# Patient Record
Sex: MALE | Race: WHITE | URBAN - METROPOLITAN AREA
[De-identification: names, ages, dates, MRNs, and addresses within clinical notes are randomized per-mention and may not be internally consistent; named-entity substitution may affect disease eponyms.]

---

## 2017-06-20 ENCOUNTER — OFFICE VISIT (OUTPATIENT)
Dept: PHARMACY | Facility: CLINIC | Age: 22
End: 2017-06-20

## 2017-06-20 VITALS
HEART RATE: 85 BPM | BODY MASS INDEX: 21.86 KG/M2 | TEMPERATURE: 98.3 F | WEIGHT: 136 LBS | HEIGHT: 66 IN | SYSTOLIC BLOOD PRESSURE: 111 MMHG | DIASTOLIC BLOOD PRESSURE: 71 MMHG | RESPIRATION RATE: 22 BRPM | OXYGEN SATURATION: 98 %

## 2017-06-20 DIAGNOSIS — F41.1 GENERALIZED ANXIETY DISORDER: Primary | ICD-10-CM

## 2017-06-20 RX ORDER — QUETIAPINE FUMARATE 50 MG/1
50 TABLET, FILM COATED ORAL
COMMUNITY
Start: 2017-05-26

## 2017-06-20 RX ORDER — ESCITALOPRAM OXALATE 20 MG/1
20 TABLET ORAL DAILY
COMMUNITY
End: 2017-07-11

## 2017-06-20 ASSESSMENT — ANXIETY QUESTIONNAIRES
7. FEELING AFRAID AS IF SOMETHING AWFUL MIGHT HAPPEN: NOT AT ALL
IF YOU CHECKED OFF ANY PROBLEMS ON THIS QUESTIONNAIRE, HOW DIFFICULT HAVE THESE PROBLEMS MADE IT FOR YOU TO DO YOUR WORK, TAKE CARE OF THINGS AT HOME, OR GET ALONG WITH OTHER PEOPLE: SOMEWHAT DIFFICULT
GAD7 TOTAL SCORE: 3
5. BEING SO RESTLESS THAT IT IS HARD TO SIT STILL: NOT AT ALL
2. NOT BEING ABLE TO STOP OR CONTROL WORRYING: NOT AT ALL
1. FEELING NERVOUS, ANXIOUS, OR ON EDGE: SEVERAL DAYS
3. WORRYING TOO MUCH ABOUT DIFFERENT THINGS: SEVERAL DAYS
6. BECOMING EASILY ANNOYED OR IRRITABLE: SEVERAL DAYS

## 2017-06-20 ASSESSMENT — PATIENT HEALTH QUESTIONNAIRE - PHQ9: 5. POOR APPETITE OR OVEREATING: NOT AT ALL

## 2017-06-20 NOTE — PROGRESS NOTES
SUBJECTIVE: Francisco J is a 21 year old male who was referred by his chemical dependency treatment center, [facility], for MT services for a complete medication review and medication counseling around transitions of care.  Francisco J has been at [facility] for almost 4 months.  He is happy with his progress in the program.  He recently transitioned to [facility]'s sober living home - the [facility].  While living there, he still attends day programming M-F and Saturday mornings.  He is set to graduate from [facility] in September, but hopes to continue living in the [facility] through December while he starts to work.    Francisco J notes that he is taking esctialopram for anxiety.  He feels that his anxiety could be better controlled, but wonders if his slight heightened anxiety may be associated with his recent transition.  He recently learned that 20 mg is the maximum dose of escitalopram.    Francisco J is taking quetiapine to help with sleep.  He notes that he previously took trazodone, but became groggy and had some muscle soreness which he associates with the medication. He is much happier taking quetiapine.  He initially experienced some dry mouth with the medication, but feels that has gone away.  When asked, he notes his weight has not changed since starting quetiapine.    Francisco J is interested in stopping smoking.  He notes that the nicotine inhaler was not covered by insurance.  He has had troubles quitting cold turkey and would like NRT to help him quit.    Francisco J wonders if there are non controlled medications that can be used to help his focus.  When asked, he notes that he doesn't have a diagnosis of ADD, but did start the evaluation (never follow through) in [facility].    Francisco J plans to continue to use his current [facility] pharmacy.  He has not established care with a primary provider.    Environmental factors that impact patient: Recently moved into sober [facility], more independence now.    Patient reports being compliant.  Sets  "medication bottles next to wallet and no problem with taking.    Patient Active Problem List   Diagnosis     Chemical dependency (H)     Rash and nonspecific skin eruption     Eczema, unspecified type     THA (generalized anxiety disorder)        OBJECTIVE:     THA-7 SCORE 9/6/2016 12/7/2016 6/20/2017   Total Score 6 14 3       PHQ-9 SCORE 9/6/2016 12/7/2016 6/20/2017   Total Score 7 10 3         VITALS:  BP Readings from Last 3 Encounters:   06/20/17 111/71   09/15/16 121/70   09/08/16 107/85           Weight:   Wt Readings from Last 1 Encounters:   06/20/17 136 lb (61.7 kg)       Height:   Ht Readings from Last 1 Encounters:   06/20/17 5' 6\" (167.6 cm)       LABORATORY VALUES:   Last A1C:  No results found for: A1C.    Last Basic Metabolic Panel:  No results found for: NA   No results found for: POTASSIUM  No results found for: CHLORIDE  No results found for: MICHAEL  No results found for: CO2  No results found for: BUN  No results found for: CR  No results found for: GLC    Lipid Panel Labs  No results found for: CHOL  No results found for: TRIG  No results found for: HDL  No results found for: LDL    Hepatic Panel Labs  No results found for: AST  No results found for: ALT      SOCIAL AND FAMILY HISTORY  Social History   Substance Use Topics     Smoking status: Current Every Day Smoker     Packs/day: 0.25     Types: Cigarettes     Smokeless tobacco: Never Used     Alcohol use 0.0 oz/week     0 Standard drinks or equivalent per week      Comment: Prior treatment    .  Most Recent Immunizations   Administered Date(s) Administered     TDAP Vaccine (Adacel) 09/04/2013       CURRENT MEDICATIONS:   Current Outpatient Prescriptions   Medication Sig Dispense Refill     QUEtiapine (SEROQUEL) 50 MG tablet Take 50 mg by mouth       escitalopram (LEXAPRO) 20 MG tablet Take 20 mg by mouth daily       albuterol (PROAIR HFA, PROVENTIL HFA, VENTOLIN HFA) 108 (90 BASE) MCG/ACT inhaler Inhale 2 puffs into the lungs every 6 hours as " needed for shortness of breath / dyspnea or wheezing       Acetaminophen (TYLENOL PO) Take 650 mg by mouth every 4 hours as needed for mild pain or fever         ALLERGIES:     Allergies   Allergen Reactions     Cats Hives     Nkda [No Known Drug Allergies]      Seasonal Allergies           ASSESSMENT:  Anxiety: At goal per GAD7.  Borderline goal per patient report.  Patient feels anxiety could be improved, but thinks it may be slightly increased since he has recently moved out of [facility].  He would like to wait to make any adjustments to medications until his life is a bit more stable.  This sounds like a good plan.  Patient notes he is using quetiapine for sleep, but this medication can also be used for anxiety and it is possible he will continue to experience some benefits from this medication.    Insomnia: At goal per patient.  Quetiapine not generally recommended for insomnia, but per above, it is possible the quetiapine is helping with anxiety and so this medication becomes a better option for this patient.  Patient does not believe he has had cholesterol panel or BG monitored.  It would be best to obtain baseline labs and then labs 3 months after initiation.  DTP:  Additional monitoring needed    Focus: Not at goal per patient.  Since patient doesn't have a diagnosis of ADD, will be best to first have evaluation.  If diagnosis is made, atomoxetine could be a good option for this patient.    Smoking cessation: Not at goal.  Patient would benefit from NRT that he thinks he would use.  He is most interested in patches.  Also somewhat interested in lozenges.     Medication transition: Patient in need of PCP.  [facility]ed [facility] at [facility].  Recommended patient see Candie Jj in clinic due to her familiarity with [facility].    All medications were reviewed and found to be indicated, effective, safe and convenient unless drug therapy problem identified as described above.    PLAN:   - Lipid panel  and BG at f/u visit  - Establish care, obtain refills at f/u visit  - Discuss focus questions, potential ADD at f/u  - At f/u discuss NRT - recommend patches with addition of lozenges    Options for treatment and/or follow-up care were reviewed with the patient. Francisco J Hendrix was engaged and actively involved in the decision making process. He/She verbalized understanding of the options discussed and was satisfied with the final plan.    Patient was provided with written instructions/medication list via AVS.     Medical conditions reviewed: 4    Medications reviewed:4    DTP identified: 1    Time spent: 45 minutes    Level of service: level 2, no charge

## 2017-06-20 NOTE — MR AVS SNAPSHOT
After Visit Summary   6/20/2017    Francisco J Hendrix    MRN: [id number]           Patient Information     Date Of Birth          1995        Visit Information        Provider Department      6/20/2017 11:00 AM Keyona Mc, PharmD [facility]        Care Instructions    Pharmacy Information    Your prescriptions are currently at [facility] at [address] [facility] ([phone])      The pharmacy you will use after you have been discharged is the same [facility] pharmacy       To transfer medications, call the new pharmacy and ask them to call the [facility] and provide them with [facility]  phone number.  Ask for all of your medications to be transferred.    Primary Care Information    Your primary care provider will be Candie Jj and their phone number is [phone]    Consider buying a multivitamin to take daily.    We'll follow up at your next visit to talk about focus/ potential ADD and treatment.                   Follow-ups after your visit        Who to contact     Please call your clinic at [phone] to:    Ask questions about your health    Make or cancel appointments    Discuss your medicines    Learn about your test results    Speak to your doctor   If you have compliments or concerns about an experience at your clinic, or if you wish to file a complaint, please contact [facility] Patient Relations at [phone] or email us at [email or web address]         Additional Information About Your Visit        MyChart Information     BookBub is an electronic gateway that provides easy, online access to your medical records. With BookBub, you can request a clinic appointment, read your test results, renew a prescription or communicate with your care team.     To sign up for [employer] visit the website at [email or web address]   You will be asked to enter the access code listed below, as well as some personal information. Please follow the  "directions to create your username and password.     Your access code is: [email or web address]  Expires: 2017 11:48 AM     Your access code will  in 90 days. If you need help or a new code, please contact your [facility] or call [phone] for assistance.        Care EveryWhere ID     This is your Care EveryWhere ID. This could be used by other organizations to access your [facility] medical records  [id number]        Your Vitals Were     Pulse Temperature Respirations Height Pulse Oximetry BMI (Body Mass Index)    85 98.3  F (36.8  C) (Oral) 22 5' 6\" (167.6 cm) 98% 21.95 kg/m2       Blood Pressure from Last 3 Encounters:   17 111/71   09/15/16 121/70   16 107/85    Weight from Last 3 Encounters:   17 136 lb (61.7 kg)   09/15/16 136 lb (61.7 kg)   16 135 lb (61.2 kg)              Today, you had the following     No orders found for display         Today's Medication Changes          These changes are accurate as of: 17 11:48 AM.  If you have any questions, ask your nurse or doctor.               These medicines have changed or have updated prescriptions.        Dose/Directions    LEXAPRO 20 MG tablet   This may have changed:  Another medication with the same name was removed. Continue taking this medication, and follow the directions you see here.   Generic drug:  escitalopram   Changed by:  Keyona Mc PharmD        Dose:  20 mg   Take 20 mg by mouth daily   Refills:  0         Stop taking these medicines if you haven't already. Please contact your care team if you have questions.     permethrin 5 % cream   Commonly known as:  ELIMITE   Stopped by:  Keyona Mc PharmD                    Primary Care Provider Office Phone # Fax #    Keyona Mc PharmD [phone]       [facility] [address] [facility] [address]        Thank you!     Thank you for choosing [facility]  for your care. Our goal " is always to provide you with excellent care. Hearing back from our patients is one way we can continue to improve our services. Please take a few minutes to complete the written survey that you may receive in the mail after your visit with us. Thank you!             Your Updated Medication List - Protect others around you: Learn how to safely use, store and throw away your medicines at www.disposemymeds.org.          This list is accurate as of: 6/20/17 11:48 AM.  Always use your most recent med list.                   Brand Name Dispense Instructions for use    albuterol 108 (90 BASE) MCG/ACT Inhaler    PROAIR HFA/PROVENTIL HFA/VENTOLIN HFA     Inhale 2 puffs into the lungs every 6 hours as needed for shortness of breath / dyspnea or wheezing       LEXAPRO 20 MG tablet   Generic drug:  escitalopram      Take 20 mg by mouth daily       QUEtiapine 50 MG tablet    SEROquel     Take 50 mg by mouth       TYLENOL PO      Take 650 mg by mouth every 4 hours as needed for mild pain or fever

## 2017-06-20 NOTE — NURSING NOTE
"21 year old  Chief Complaint   Patient presents with     Consult     Medication Review - No MAR       Blood pressure 111/71, pulse 85, temperature 98.3  F (36.8  C), temperature source Oral, resp. rate 22, height 5' 6\" (167.6 cm), weight 136 lb (61.7 kg), SpO2 98 %. Body mass index is 21.95 kg/(m^2).  BP completed using cuff size: regular    Patient Active Problem List   Diagnosis     Chemical dependency (H)     Rash and nonspecific skin eruption     Eczema, unspecified type     THA (generalized anxiety disorder)       Wt Readings from Last 2 Encounters:   06/20/17 136 lb (61.7 kg)   09/15/16 136 lb (61.7 kg)     BP Readings from Last 3 Encounters:   06/20/17 111/71   09/15/16 121/70   09/08/16 107/85       Current Outpatient Prescriptions   Medication     QUEtiapine (SEROQUEL) 50 MG tablet     escitalopram (LEXAPRO) 10 MG tablet     albuterol (PROAIR HFA, PROVENTIL HFA, VENTOLIN HFA) 108 (90 BASE) MCG/ACT inhaler     Acetaminophen (TYLENOL PO)     No current facility-administered medications for this visit.      Facility-Administered Medications Ordered in Other Visits   Medication     Self Administer Medications: Behavioral Services       Social History   Substance Use Topics     Smoking status: Current Every Day Smoker     Packs/day: 0.25     Types: Cigarettes     Smokeless tobacco: Never Used     Alcohol use 0.0 oz/week     0 Standard drinks or equivalent per week      Comment: Prior treatment       Health Maintenance Due   Topic Date Due     HPV IMMUNIZATION (1 of 3 - Male 3 Dose Series) 06/23/2006     PEDS DTAP/TDAP (2 - Td) 10/02/2013       No results found for: PAP    PHQ-2 ( 1999 Pf[employer]) 6/20/2017 9/12/2013   Q1: Little interest or pleasure in doing things 1 0   Q2: Feeling down, depressed or hopeless 0 0   PHQ-2 Score 1 0       PHQ-9 SCORE 6/20/2017   Total Score 3   Some encounter information is confidential and restricted. Go to Review Flowsheets activity to see all data.       THA-7 SCORE 6/20/2017 "   Total Score 3   Some encounter information is confidential and restricted. Go to Review Flowsheets activity to see all data.       No flowsheet data found.    No Garcia CMA  June 20, 2017 11:07 AM

## 2017-06-20 NOTE — PATIENT INSTRUCTIONS
Pharmacy Information    Your prescriptions are currently at [facility] at [address] ([phone])      The pharmacy you will use after you have been discharged is the same [facility] pharmacy       To transfer medications, call the new pharmacy and ask them to call the [facility] and provide them with [facility]  phone number.  Ask for all of your medications to be transferred.    Primary Care Information    Your primary care provider will be Candie Jj and their phone number is [phone]    Consider buying a multivitamin to take daily.    We'll follow up at your next visit to talk about focus/ potential ADD and treatment.

## 2017-06-21 ASSESSMENT — ANXIETY QUESTIONNAIRES: GAD7 TOTAL SCORE: 3

## 2017-06-21 ASSESSMENT — PATIENT HEALTH QUESTIONNAIRE - PHQ9: SUM OF ALL RESPONSES TO PHQ QUESTIONS 1-9: 3

## 2017-07-11 ENCOUNTER — OFFICE VISIT (OUTPATIENT)
Dept: FAMILY MEDICINE | Facility: CLINIC | Age: 22
End: 2017-07-11

## 2017-07-11 ENCOUNTER — OFFICE VISIT (OUTPATIENT)
Dept: PHARMACY | Facility: CLINIC | Age: 22
End: 2017-07-11

## 2017-07-11 VITALS
HEART RATE: 72 BPM | BODY MASS INDEX: 22.03 KG/M2 | HEIGHT: 66 IN | DIASTOLIC BLOOD PRESSURE: 68 MMHG | WEIGHT: 137.1 LBS | TEMPERATURE: 98.2 F | SYSTOLIC BLOOD PRESSURE: 116 MMHG | OXYGEN SATURATION: 99 %

## 2017-07-11 DIAGNOSIS — G47.00 PERSISTENT INSOMNIA: ICD-10-CM

## 2017-07-11 DIAGNOSIS — F19.20 CHEMICAL DEPENDENCY (H): Primary | ICD-10-CM

## 2017-07-11 DIAGNOSIS — R06.02 SOB (SHORTNESS OF BREATH): ICD-10-CM

## 2017-07-11 DIAGNOSIS — Z13.220 LIPID SCREENING: ICD-10-CM

## 2017-07-11 DIAGNOSIS — Z72.0 TOBACCO ABUSE: ICD-10-CM

## 2017-07-11 DIAGNOSIS — R41.840 DIFFICULTY CONCENTRATING: ICD-10-CM

## 2017-07-11 DIAGNOSIS — F41.1 GAD (GENERALIZED ANXIETY DISORDER): ICD-10-CM

## 2017-07-11 RX ORDER — ESCITALOPRAM OXALATE 20 MG/1
20 TABLET ORAL DAILY
Qty: 30 TABLET | Refills: 2 | Status: SHIPPED | OUTPATIENT
Start: 2017-07-11

## 2017-07-11 RX ORDER — ASPIRIN 325 MG
650 TABLET ORAL PRN
COMMUNITY
End: 2017-07-11

## 2017-07-11 RX ORDER — ESCITALOPRAM OXALATE 20 MG/1
20 TABLET ORAL DAILY
COMMUNITY
Start: 2017-01-20 | End: 2017-07-11

## 2017-07-11 RX ORDER — NICOTINE 21 MG/24HR
1 PATCH, TRANSDERMAL 24 HOURS TRANSDERMAL EVERY 24 HOURS
Qty: 30 PATCH | Refills: 1 | Status: SHIPPED | OUTPATIENT
Start: 2017-07-11

## 2017-07-11 RX ORDER — ALBUTEROL SULFATE 90 UG/1
2 AEROSOL, METERED RESPIRATORY (INHALATION) PRN
COMMUNITY
Start: 2016-08-24

## 2017-07-11 ASSESSMENT — ANXIETY QUESTIONNAIRES
1. FEELING NERVOUS, ANXIOUS, OR ON EDGE: MORE THAN HALF THE DAYS
2. NOT BEING ABLE TO STOP OR CONTROL WORRYING: SEVERAL DAYS
3. WORRYING TOO MUCH ABOUT DIFFERENT THINGS: NOT AT ALL
GAD7 TOTAL SCORE: 4
6. BECOMING EASILY ANNOYED OR IRRITABLE: SEVERAL DAYS
IF YOU CHECKED OFF ANY PROBLEMS ON THIS QUESTIONNAIRE, HOW DIFFICULT HAVE THESE PROBLEMS MADE IT FOR YOU TO DO YOUR WORK, TAKE CARE OF THINGS AT HOME, OR GET ALONG WITH OTHER PEOPLE: SOMEWHAT DIFFICULT
5. BEING SO RESTLESS THAT IT IS HARD TO SIT STILL: NOT AT ALL
7. FEELING AFRAID AS IF SOMETHING AWFUL MIGHT HAPPEN: NOT AT ALL

## 2017-07-11 ASSESSMENT — PATIENT HEALTH QUESTIONNAIRE - PHQ9: 5. POOR APPETITE OR OVEREATING: NOT AT ALL

## 2017-07-11 ASSESSMENT — PAIN SCALES - GENERAL: PAINLEVEL: NO PAIN (0)

## 2017-07-11 NOTE — PROGRESS NOTES
SUBJECTIVE: Francisco J is a 22 year old male who is here to establish primary care,and  follow up on his medications. Co-visit with Cnadie Jj NP.      Environmental factors that impact patient: housing situation - currently has 10 roommates including 2 who share the same floor in the loft of his current house. Also impacted by programing at  [employer] as he reports that he mainly smokes when attending program there. Francisco J also reports that he is hoping to be working within the next week or 2 at [employer].    Francisco J reports being compliant all of the time and patient reports some side effects including dry mouth from the Seroquel.     Anxiety: Francicso J is interested in tapering off of the escitalopram. States that there was one time last week where he forgot to take it in the morning and didn't notice any difference in his mood.  States that he has also been feeling more on edge lately despite being on the escitalopram.    Insomnia: Francisco J states that with the Seroquel he wakes up about once per night where as without the Seroquel he wakes up 3-4 times and also takes longer to fall asleep.  However, he states that he might be interested in tapering off of the Seroquel in a month or so once he is busier with school and work as he sleeps better when he is busy during the day.    Smoking Cessation: Francisco J states that he wants to try the patches for smoking cessation. Is hoping to quit by the end of the month.    Back Pain: States back pain has gotten betters since he started stretching in the morning and evening. Uses Tylenol a lot less now.    Patient Active Problem List   Diagnosis     Chemical dependency (H)     Eczema, unspecified type     THA (generalized anxiety disorder)     Persistent insomnia     SOB (shortness of breath)     Tobacco abuse        OBJECTIVE:     THA-7 SCORE 6/20/2017 7/11/2017   Total Score 3 4   Some encounter information is confidential and restricted. Go to Review Flowsheets activity to see all  "data.       PHQ-9 SCORE 6/20/2017 7/11/2017   Total Score 3 3   Some encounter information is confidential and restricted. Go to Review Flowsheets activity to see all data.     VITALS:  BP Readings from Last 3 Encounters:   07/11/17 116/68   06/20/17 111/71   09/15/16 121/70         Weight:   Wt Readings from Last 1 Encounters:   07/11/17 137 lb 1.6 oz (62.2 kg)       Height:   Ht Readings from Last 1 Encounters:   07/11/17 5' 6.1\" (167.9 cm)     SOCIAL AND FAMILY HISTORY  Social History   Substance Use Topics     Smoking status: Current Every Day Smoker     Packs/day: 0.25     Types: Cigarettes, Other     Smokeless tobacco: Never Used      Comment: Started smoking at age 13     Alcohol use No      Comment: Clean date 4/25/17    .  Most Recent Immunizations   Administered Date(s) Administered     TDAP Vaccine (Adacel) 09/04/2013       CURRENT MEDICATIONS:   Current Outpatient Prescriptions   Medication Sig Dispense Refill     albuterol (PROAIR HFA/PROVENTIL HFA/VENTOLIN HFA) 108 (90 BASE) MCG/ACT Inhaler Inhale 2 puffs into the lungs as needed       nicotine (NICODERM CQ) 14 MG/24HR 24 hr patch Place 1 patch onto the skin every 24 hours 30 patch 1     escitalopram (LEXAPRO) 20 MG tablet Take 1 tablet (20 mg) by mouth daily 30 tablet 2     QUEtiapine (SEROQUEL) 50 MG tablet Take 50 mg by mouth       Acetaminophen (TYLENOL PO) Take 650 mg by mouth every 4 hours as needed for mild pain or fever       [DISCONTINUED] escitalopram (LEXAPRO) 20 MG tablet Take 20 mg by mouth daily       [DISCONTINUED] escitalopram (LEXAPRO) 20 MG tablet Take 20 mg by mouth daily       [DISCONTINUED] albuterol (PROAIR HFA, PROVENTIL HFA, VENTOLIN HFA) 108 (90 BASE) MCG/ACT inhaler Inhale 2 puffs into the lungs every 6 hours as needed for shortness of breath / dyspnea or wheezing         ALLERGIES:     Allergies   Allergen Reactions     Cats Hives     Nkda [No Known Drug Allergies]      Seasonal Allergies           ASSESSMENT:    Anxiety: Not " optimally controlled with current medication.  May benefit from tapering off of escitalopram or switching to another medication in the future, but with upcoming changes now may not be the optimal time to do so.     Insomnia: At goal per patient.  Plan to taper off of Seroquel in a few months after living situation and schedule have stabilized sounds like a good plan.  Would be good to obtain a baseline BG and lipid panel and then labs 3 months after.    Smoking Cessation: Appropriate starting dose of the Nicoderm patch would be14mg/24hr for the first 6 weeks as only smoking 0.25-0.5packs/day. Would need to establish a quite date to start the patches on.    Back Pain: Controlled per patient report.    All medications were reviewed and found to be indicated, effective, safe and convenient unless drug therapy problem identified as described above.    PLAN:     Anxiety: Continue taking Escitalopram 20mg tablet daily.    Insomnia: Continue taking Seroqeul 50mg tablet in the evening.    Smoking Cessation: Start 14mg/24hr Nicoderm patch on 7/22. May use E-cig as needed for breakthrough cravings.    Obtain fasting BMP and Lipid panel in one week.    Options for treatment and/or follow-up care were reviewed with the patient. Francisco J Hendrix was engaged and actively involved in the decision making process. He/She verbalized understanding of the options discussed and was satisfied with the final plan.    Patient was provided with written instructions/medication list via AVS.     Linda Alvarez, Pharm D Student    Medical conditions reviewed: 4    Medications reviewed: 4    DTP identified: 1    Time spent: 45 min    Level of service: 2

## 2017-07-11 NOTE — MR AVS SNAPSHOT
After Visit Summary   7/11/2017    Francisco J Hendrix    MRN: [id number]           Patient Information     Date Of Birth          1995        Visit Information        Provider Department      7/11/2017 2:30 PM Candie Jj NP [facility]        Today's Diagnoses     Chemical dependency (H)    -  1    SOB (shortness of breath)        Tobacco abuse        THA (generalized anxiety disorder)        Persistent insomnia        Lipid screening        Difficulty concentrating           Follow-ups after your visit        Additional Services     PSYCHIATRY REFERRAL [facility]       Your provider has referred you to: [facility] for a Primary Care Consultation. Please call [phone] to make an appointment.    Clinic is located at:  [facility], 2nd Floor  [address], [address]  [facility] [address]    Please complete the following questions:    Reason for Referral: attentional problems    What specific question is it most critical for you and the patient to have answered? Does Francisco J have ADD or ADHD    You have requested a one-time CONSULTATION visit. This means that although we will make recommendations and provide a multi-step plan where appropriate, the Psychiatry Clinic will not provide ongoing care. We expect that as the referring provider, you will continue to see and manage the patient's care primarily, and we will remain available via Epic for any ongoing questions that arise after the initial visit. In rare and unusually complex cases, we may schedule a follow up visit to complete the assessment, but this should also not be seen as taking over the case.    Is this acceptable to you? Yes                  Follow-up notes from your care team     Return in about 1 month (around 8/11/2017) for Follow up smoking, medication.      Future tests that were ordered for you today     Open Future Orders        Priority Expected Expires  "Ordered    Comprehensive metabolic panel Routine  2018    Lipid panel reflex to direct LDL Routine  2018    Spirometry, Breathing Capacity Routine  2017            Who to contact     Please call your clinic at [phone] to:    Ask questions about your health    Make or cancel appointments    Discuss your medicines    Learn about your test results    Speak to your doctor   If you have compliments or concerns about an experience at your clinic, or if you wish to file a complaint, please contact [facility] Patient Relations at [phone] or email us at [email or web address]         Additional Information About Your Visit        [employer] Information     [employer] is an electronic gateway that provides easy, online access to your medical records. With [employer], you can request a clinic appointment, read your test results, renew a prescription or communicate with your care team.     To sign up for Story[employer] visit the website at [email or web address]   You will be asked to enter the access code listed below, as well as some personal information. Please follow the directions to create your username and password.     Your access code is: [email or web address]  Expires: 2017 11:48 AM     Your access code will  in 90 days. If you need help or a new code, please contact your [facility] or call [phone] for assistance.        Care EveryWhere ID     This is your Care EveryWhere ID. This could be used by other organizations to access your [facility] medical records  [id number]        Your Vitals Were     Pulse Temperature Height Pulse Oximetry BMI (Body Mass Index)       72 98.2  F (36.8  C) (Oral) 5' 6.1\" (167.9 cm) 99% 22.06 kg/m2        Blood Pressure from Last 3 Encounters:   17 116/68   17 111/71   09/15/16 121/70    Weight from Last 3 Encounters:   17 137 lb 1.6 oz (62.2 kg)   17 " 136 lb (61.7 kg)   09/15/16 136 lb (61.7 kg)              We Performed the Following     PSYCHIATRY REFERRAL [facility]     SMOKING CESSATION COUNSELING 3-10 MIN          Today's Medication Changes          These changes are accurate as of: 7/11/17  3:45 PM.  If you have any questions, ask your nurse or doctor.               Start taking these medicines.        Dose/Directions    nicotine 14 MG/24HR 24 hr patch   Commonly known as:  NICODERM CQ   Used for:  Tobacco abuse   Started by:  Candie Jj NP        Dose:  1 patch   Place 1 patch onto the skin every 24 hours   Quantity:  30 patch   Refills:  1         Stop taking these medicines if you haven't already. Please contact your care team if you have questions.     aspirin 325 MG tablet   Stopped by:  Candie Jj NP                Where to get your medicines      These medications were sent to [facility]/pharmacy #[id number] [place], [place] - [address]  [address] [facility] [address]     Phone:  [phone]     nicotine 14 MG/24HR 24 hr patch         Some of these will need a paper prescription and others can be bought over the counter.  Ask your nurse if you have questions.     Bring a paper prescription for each of these medications     escitalopram 20 MG tablet                Primary Care Provider Office Phone # Fax #    Keyona Mc, PharmD [phone]       [facility] [address] [place] [address]        Equal Access to Services     АЛЕКСАНДР CRUZ AH: Hadii christina calix hadasho Soomaali, waaxda luqadaha, qaybta kaalmada adeegyada, harley pennington. So [facility] [phone].    ATENCIÓN: Si habla español, tiene a romero disposición servicios gratuitos de asistencia lingüística. Llame al [phone].    We comply with applicable federal civil rights laws and [place] laws. We do not discriminate on the basis of race, color, national origin, age, disability sex, sexual orientation or gender  identity.            Thank you!     Thank you for choosing [facility]  for your care. Our goal is always to provide you with excellent care. Hearing back from our patients is one way we can continue to improve our services. Please take a few minutes to complete the written survey that you may receive in the mail after your visit with us. Thank you!             Your Updated Medication List - Protect others around you: Learn how to safely use, store and throw away your medicines at www.disposemymeds.org.          This list is accurate as of: 7/11/17  3:45 PM.  Always use your most recent med list.                   Brand Name Dispense Instructions for use Diagnosis    albuterol 108 (90 BASE) MCG/ACT Inhaler    PROAIR HFA/PROVENTIL HFA/VENTOLIN HFA     Inhale 2 puffs into the lungs as needed        escitalopram 20 MG tablet    LEXAPRO    30 tablet    Take 1 tablet (20 mg) by mouth daily    THA (generalized anxiety disorder)       nicotine 14 MG/24HR 24 hr patch    NICODERM CQ    30 patch    Place 1 patch onto the skin every 24 hours    Tobacco abuse       QUEtiapine 50 MG tablet    SEROquel     Take 50 mg by mouth        TYLENOL PO      Take 650 mg by mouth every 4 hours as needed for mild pain or fever

## 2017-07-11 NOTE — MR AVS SNAPSHOT
After Visit Summary   7/11/2017    Francisco J Hendrix    MRN: [id number]           Patient Information     Date Of Birth          1995        Visit Information        Provider Department      7/11/2017 2:30 PM Keyona Mc PharmD [facility]        Today's Diagnoses     Chemical dependency (H)    -  1    THA (generalized anxiety disorder)        Tobacco abuse           Follow-ups after your visit        Your next 10 appointments already scheduled     Aug 15, 2017  9:00 AM CDT   Return Visit with Candie Jj NP   [facility] ([facility])    [address] [place] [address]   [phone]            Aug 15, 2017  9:00 AM CDT   Return Visit with Keyona Mc PharmD   [facility] ([facility])    [address] [place] [address]   [phone]              Who to contact     Please call your clinic at [phone] to:    Ask questions about your health    Make or cancel appointments    Discuss your medicines    Learn about your test results    Speak to your doctor   If you have compliments or concerns about an experience at your clinic, or if you wish to file a complaint, please contact [facility] Patient Relations at [phone] or email us at [email or web address]         Additional Information About Your Visit        MyChart Information     [employer] is an electronic gateway that provides easy, online access to your medical records. With [employer], you can request a clinic appointment, read your test results, renew a prescription or communicate with your care team.     To sign up for [employer] visit the website at [email or web address]   You will be asked to enter the access code listed below, as well as some personal information. Please follow the directions to create your username and password.     Your access code is: [email or web address]  Expires: 9/18/2017 11:48 AM     Your  access code will  in 90 days. If you need help or a new code, please contact your [facility] or call [phone] for assistance.        Care EveryWhere ID     This is your Care EveryWhere ID. This could be used by other organizations to access your [facility] medical records  [id number]         Blood Pressure from Last 3 Encounters:   17 116/68   17 111/71   09/15/16 121/70    Weight from Last 3 Encounters:   17 136 lb (61.7 kg)   17 137 lb 1.6 oz (62.2 kg)   17 136 lb (61.7 kg)              Today, you had the following     No orders found for display         Today's Medication Changes          These changes are accurate as of: 17 11:59 PM.  If you have any questions, ask your nurse or doctor.               Start taking these medicines.        Dose/Directions    nicotine 14 MG/24HR 24 hr patch   Commonly known as:  NICODERM CQ   Used for:  Tobacco abuse   Started by:  Candie Jj NP        Dose:  1 patch   Place 1 patch onto the skin every 24 hours   Quantity:  30 patch   Refills:  1         Stop taking these medicines if you haven't already. Please contact your care team if you have questions.     aspirin 325 MG tablet   Stopped by:  Candie Jj NP                Where to get your medicines      These medications were sent to [facility]/pharmacy #[id number] [address] [address]     Phone:  [phone]     nicotine 14 MG/24HR 24 hr patch         Some of these will need a paper prescription and others can be bought over the counter.  Ask your nurse if you have questions.     Bring a paper prescription for each of these medications     escitalopram 20 MG tablet                Primary Care Provider Office Phone # Fax #    Keyona Mc, ShanD [phone]       [facility] [address] [facility] [address]        Equal Access to Services     АЛЕКСАНДР CRUZ  AH: Hadavtar andrewszuleimazeke Nel, waaxda luqadaha, qaybta kabrynn simon, harley blossom demarcofelisa galvinkeilaradha pennington. So [facility] [phone].    ATENCIÓN: Si habla avni, tiene a romero disposición servicios gratuitos de asistencia lingüística. Llame al [phone].    We comply with applicable federal civil rights laws and [place] laws. We do not discriminate on the basis of race, color, national origin, age, disability sex, sexual orientation or gender identity.            Thank you!     Thank you for choosing [facility]  for your care. Our goal is always to provide you with excellent care. Hearing back from our patients is one way we can continue to improve our services. Please take a few minutes to complete the written survey that you may receive in the mail after your visit with us. Thank you!             Your Updated Medication List - Protect others around you: Learn how to safely use, store and throw away your medicines at www.disposemymeds.org.          This list is accurate as of: 7/11/17 11:59 PM.  Always use your most recent med list.                   Brand Name Dispense Instructions for use Diagnosis    albuterol 108 (90 BASE) MCG/ACT Inhaler    PROAIR HFA/PROVENTIL HFA/VENTOLIN HFA     Inhale 2 puffs into the lungs as needed        escitalopram 20 MG tablet    LEXAPRO    30 tablet    Take 1 tablet (20 mg) by mouth daily    THA (generalized anxiety disorder)       nicotine 14 MG/24HR 24 hr patch    NICODERM CQ    30 patch    Place 1 patch onto the skin every 24 hours    Tobacco abuse       QUEtiapine 50 MG tablet    SEROquel     Take 50 mg by mouth        TYLENOL PO      Take 650 mg by mouth every 4 hours as needed for mild pain or fever

## 2017-07-11 NOTE — NURSING NOTE
"22 year old  Chief Complaint   Patient presents with     Recheck Medication     Done with outpatient care, would like to go over his medications, and check cholestrol.       Blood pressure 116/68, pulse 72, temperature 98.2  F (36.8  C), temperature source Oral, height 5' 6.1\" (167.9 cm), weight 137 lb 1.6 oz (62.2 kg), SpO2 99 %. Body mass index is 22.06 kg/(m^2).  BP completed using cuff size: regular    Patient Active Problem List   Diagnosis     Chemical dependency (H)     Rash and nonspecific skin eruption     Eczema, unspecified type     THA (generalized anxiety disorder)       Wt Readings from Last 2 Encounters:   07/11/17 137 lb 1.6 oz (62.2 kg)   06/20/17 136 lb (61.7 kg)     BP Readings from Last 3 Encounters:   07/11/17 116/68   06/20/17 111/71   09/15/16 121/70       Current Outpatient Prescriptions   Medication     aspirin 325 MG tablet     albuterol (PROAIR HFA/PROVENTIL HFA/VENTOLIN HFA) 108 (90 BASE) MCG/ACT Inhaler     escitalopram (LEXAPRO) 20 MG tablet     QUEtiapine (SEROQUEL) 50 MG tablet     Acetaminophen (TYLENOL PO)     [DISCONTINUED] albuterol (PROAIR HFA, PROVENTIL HFA, VENTOLIN HFA) 108 (90 BASE) MCG/ACT inhaler     No current facility-administered medications for this visit.      Facility-Administered Medications Ordered in Other Visits   Medication     Self Administer Medications: Behavioral Services       Social History   Substance Use Topics     Smoking status: Current Every Day Smoker     Packs/day: 0.25     Types: Cigarettes     Smokeless tobacco: Never Used      Comment: Would like patches.     Alcohol use No      Comment: Prior treatment       There are no preventive care reminders to display for this patient.    No results found for: PAP    PHQ-2 ( 1999 Pf[employer]) 7/11/2017 6/20/2017   Q1: Little interest or pleasure in doing things 1 1   Q2: Feeling down, depressed or hopeless 0 0   PHQ-2 Score 1 1       PHQ-9 SCORE 6/20/2017 7/11/2017   Total Score 3 3   Some encounter information " is confidential and restricted. Go to Review Flowsheets activity to see all data.       THA-7 SCORE 6/20/2017 7/11/2017   Total Score 3 4   Some encounter information is confidential and restricted. Go to Review Flowsheets activity to see all data.       No flowsheet data found.    Doris Jimenez MA  July 11, 2017 2:53 PM

## 2017-07-11 NOTE — PROGRESS NOTES
HPI:       Francisco J Hendrix is a 22 year old who presents for the following  Patient presents with:  Recheck Medication: Done with outpatient care, would like to go over his medications, and check cholestrol.    Francisco J is a 22 year old male who comes in to establish care. He is new to this clinic and this writer. Co-visit with Keyona Mc pharmacist and her pharmacy student Jennifer Alvarez.     Chemical dependency:  He completed the inpatient residential program x 3 months now he is in the after care program. He had a physical at [facility] in march 2017 prior to starting his program. 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 He is hoping to get a job at [employer]. Going to [place] this fall, he is studying a 2 year automotive degree.      Shortness of breath:  He has a hx shortness of breath and respiratory infections in the past needing albuterol. No wheezing. He does get some shortness of breath with some vigorous exercise. He has mild seasonal allergies. He uses his albuterol inhaler monthly. He has hx of eczema. He was never diagnosed with asthma in the past. He has never done spirometry.    Tobacco abuse:  Smoking 5-10 cig a day, started smoking at 13 years old. He quit for 4 months. He was in [facility]. He wants to have better lung health. He has been using his e-cig. He uses his e-cig 5-10 times a day. He's used inhaler and lozenges in the past. He is open to using gum to help him quit. He would like to quit 7/22/17. He is smoking after meals, he has been cutting back.     He takes tylenol as needed for a stiff lower back.     THA and Insomnia:  He is taking lexapro 20 mg po daily and seroquel 50 mg po daily at bedtime.   THA-7 SCORE 6/20/2017 7/11/2017   Total Score 3 4   Some encounter information is confidential and restricted. Go to Review Flowsheets activity to see all data.     PHQ-9 SCORE 6/20/2017 7/11/2017   Total Score 3 3   Some encounter information is confidential  and restricted. Go to Review Flowsheets activity to see all data.     He forgot to take a dose and didn't feel any different. He doesn't feel the lexapro is doing much. He is not as energetic or playful as he usually is. He is more one edge. He started lexapro September 2016. He would like to taper off eventually. He would like to decrease after his job change in September 2017. He takes Seroquel 50 mg at bedtime. He gets dry mouth from his Seroquel. He would like to decrease his Seroquel in the future.      Problem, Medication and Allergy Lists were reviewed and are current.  Patient is   a new patient to this clinic and so  I reviewed/updated the Past Medical History, the Family History and the Social History. ,   Past Medical History:   Diagnosis Date     Chicken pox      Depressive disorder    ,   Family History     Problem (# of Occurrences) Relation (Name,Age of Onset)    Anxiety Disorder (3) Father (Max), Sister (Connie), Mother (Kiley)    CEREBROVASCULAR DISEASE (1) Paternal Grandfather (Cornell)    Coronary Artery Disease (1) Paternal Grandfather (Cornell)    Depression (1) Father (Max)    Gout (1) Maternal Grandfather    Hypertension (1) Maternal Grandfather    MENTAL ILLNESS (1) Father (Max)    Substance Abuse (1) Paternal Grandmother (Sandy)    Unknown/Adopted (2) Maternal Grandmother (Sindi), Paternal Grandmother (Sandy)       Negative family history of: Schizophrenia, Bipolar Disorder, Suicide, Dementia, Tatyana Disease, Parkinsonism, Autism Spectrum Disorder, Intellectual Disability (Mental Retardation)       and   Social History     Social History     Marital status: Single     Spouse name: N/A     Number of children: 0     Years of education: 12th     Social History Main Topics     Smoking status: Current Every Day Smoker     Packs/day: 0.25     Types: Cigarettes, Other     Smokeless tobacco: Never Used      Comment: Started smoking at age 13     Alcohol use No      Comment: Clean date 4/25/17  "    Drug use: No      Comment: Prior treatment, clean date 4/25/17     Sexual activity: Yes     Partners: Female     Birth control/ protection: Abstinence, Condom     Other Topics Concern     None     Social History Narrative    Activity: Walking, free weights, skate boarding, 3 x week 60 min    Diet: He is a picky eater, he has average to healthy diet, he doesn't enjoy noodles or rice or foods with soft textures    Water Intake: Enjoys drinking water 1/2-1 gallon a day    Caffeine Intake: 150 mg a day, 1-2 cups of coffee and 1-2 energy drinks    Sleep: 7 hours of sleep a night, some difficulty falling and staying asleep, takes Seroquel for sleep    Stressors: Some housing stressors, anxious starting work    Support Network: [facility] Keyen, family, closest with his sister    Soledad/Mormonism Affiliation: Raised Buddhist    Childhood: Born in [place], raised in [place], moved back to [place] in high school    Current Living Situation: Shares a room with 2 roommates    Future: Go back to school                Review of Systems:   Review of Systems   All other systems reviewed and are negative.            Physical Exam:   Patient Vitals for the past 24 hrs:   BP Temp Temp src Pulse SpO2 Height Weight   07/11/17 1445 116/68 98.2  F (36.8  C) Oral 72 99 % 5' 6.1\" (167.9 cm) 137 lb 1.6 oz (62.2 kg)     Body mass index is 22.06 kg/(m^2).  Vitals were reviewed and were normal     Physical Exam   Constitutional: He is oriented to person, place, and time. He appears well-developed and well-nourished.   HENT:   Head: Normocephalic and atraumatic.   Right Ear: Tympanic membrane and ear canal normal.   Left Ear: Tympanic membrane and ear canal normal.   Nose: Nose normal.   Mouth/Throat: Oropharynx is clear and moist.   Eyes: Pupils are equal, round, and reactive to light.   Neck: Neck supple.   Cardiovascular: Normal rate, regular rhythm, normal heart sounds and intact distal pulses.    Pulmonary/Chest: Effort normal and breath sounds normal. "   Lymphadenopathy:     He has no cervical adenopathy.   Neurological: He is alert and oriented to person, place, and time.   Skin: Skin is warm and dry.   Psychiatric: He has a normal mood and affect. His behavior is normal. Judgment and thought content normal.         Results:      Results from the last 24 hoursNo results found for this or any previous visit (from the past 24 hour(s)).  Assessment and Plan     Francisco J was seen today for recheck medication.    Diagnoses and all orders for this visit:    Chemical dependency (H)  He completed the inpatient residential program x 3 months now he is in the after care program. 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 Patient encouraged to continue in his sobriety.    SOB (shortness of breath)  -     Spirometry, Breathing Capacity; Future  He has a hx shortness of breath and respiratory infections in the past needing albuterol. No wheezing. He does get some shortness of breath with some vigorous exercise. He has mild seasonal allergies. He uses his albuterol inhaler monthly. He has hx of eczema. Rule out asthma with spirometry testing in the future    Tobacco abuse  -     SMOKING CESSATION COUNSELING 3-10 MIN  -     nicotine (NICODERM CQ) 14 MG/24HR 24 hr patch; Place 1 patch onto the skin every 24 hours  Smoking 5-10 cig a day, started smoking at 13 years old. He uses his e-cig 5-10 times a day. Quit date 7/22/17. Start patches     THA (generalized anxiety disorder)  -     Comprehensive metabolic panel; Future  -     escitalopram (LEXAPRO) 20 MG tablet; Take 1 tablet (20 mg) by mouth daily  THA-7 SCORE 6/20/2017 7/11/2017   Total Score 3 4   Some encounter information is confidential and restricted. Go to Review Flowsheets activity to see all data.     PHQ-9 SCORE 6/20/2017 7/11/2017   Total Score 3 3   Some encounter information is confidential and restricted. Go to Review Flowsheets activity to see all data.     Persistent insomnia  Continue  seroquel 50 mg po daily, patient desires to titrate and decrease over time. Reports dry mouth with seroquel, will do a trial of decrease in the future,    Lipid screening  -     Lipid panel reflex to direct LDL; Future    Difficulty concentrating  -     PSYCHIATRY REFERRAL [facility]  Referral for psychiatry for difficulty concentrating, consider Strattera, will be starting school in the future.     Medications Discontinued During This Encounter   Medication Reason     albuterol (PROAIR HFA, PROVENTIL HFA, VENTOLIN HFA) 108 (90 BASE) MCG/ACT inhaler Erroneous Entry     escitalopram (LEXAPRO) 20 MG tablet Erroneous Entry     Options for treatment and follow-up care were reviewed with the patient. Francisco J Hendrix  engaged in the decision making process and verbalized understanding of the options discussed and agreed with the final plan.    Candie Jj NP

## 2017-07-12 ASSESSMENT — PATIENT HEALTH QUESTIONNAIRE - PHQ9: SUM OF ALL RESPONSES TO PHQ QUESTIONS 1-9: 3

## 2017-07-12 ASSESSMENT — ANXIETY QUESTIONNAIRES: GAD7 TOTAL SCORE: 4

## 2017-07-14 ENCOUNTER — ALLIED HEALTH/NURSE VISIT (OUTPATIENT)
Dept: FAMILY MEDICINE | Facility: CLINIC | Age: 22
End: 2017-07-14

## 2017-07-14 ENCOUNTER — TRANSFERRED RECORDS (OUTPATIENT)
Dept: HEALTH INFORMATION MANAGEMENT | Facility: CLINIC | Age: 22
End: 2017-07-14

## 2017-07-14 VITALS — WEIGHT: 136 LBS | HEIGHT: 67 IN | BODY MASS INDEX: 21.35 KG/M2

## 2017-07-14 DIAGNOSIS — F41.1 GAD (GENERALIZED ANXIETY DISORDER): ICD-10-CM

## 2017-07-14 DIAGNOSIS — Z13.220 LIPID SCREENING: ICD-10-CM

## 2017-07-14 DIAGNOSIS — R06.02 SOB (SHORTNESS OF BREATH): ICD-10-CM

## 2017-07-14 LAB
ALBUMIN SERPL-MCNC: 4.5 G/DL (ref 3.4–5)
ALP SERPL-CCNC: 94 U/L (ref 40–150)
ALT SERPL W P-5'-P-CCNC: 28 U/L (ref 0–70)
ANION GAP SERPL CALCULATED.3IONS-SCNC: 8 MMOL/L (ref 3–14)
AST SERPL W P-5'-P-CCNC: 25 U/L (ref 0–45)
BILIRUB SERPL-MCNC: 0.5 MG/DL (ref 0.2–1.3)
BUN SERPL-MCNC: 17 MG/DL (ref 7–30)
CALCIUM SERPL-MCNC: 9.6 MG/DL (ref 8.5–10.1)
CHLORIDE SERPL-SCNC: 108 MMOL/L (ref 94–109)
CHOLEST SERPL-MCNC: 146 MG/DL
CO2 SERPL-SCNC: 24 MMOL/L (ref 20–32)
CREAT SERPL-MCNC: 0.81 MG/DL (ref 0.66–1.25)
GFR SERPL CREATININE-BSD FRML MDRD: ABNORMAL ML/MIN/1.7M2
GLUCOSE SERPL-MCNC: 68 MG/DL (ref 70–99)
HDLC SERPL-MCNC: 49 MG/DL
LDLC SERPL CALC-MCNC: 80 MG/DL
NONHDLC SERPL-MCNC: 97 MG/DL
POTASSIUM SERPL-SCNC: 3.9 MMOL/L (ref 3.4–5.3)
PROT SERPL-MCNC: 7.1 G/DL (ref 6.8–8.8)
SODIUM SERPL-SCNC: 140 MMOL/L (ref 133–144)
TRIGL SERPL-MCNC: 83 MG/DL

## 2017-07-14 NOTE — LETTER
July 19, 2017       TO: Francisco J Hendrix  [address] [facility] [address]       Dear ,    We are writing to inform you of your test results.    Your test results fall within the expected range(s) and are stable.     Resulted Orders   Lipid panel reflex to direct LDL   Result Value Ref Range    Cholesterol 146 <200 mg/dL    Triglycerides 83 <150 mg/dL    HDL Cholesterol 49 >39 mg/dL    LDL Cholesterol Calculated 80 <100 mg/dL      Comment:      Desirable:       <100 mg/dl    Non HDL Cholesterol 97 <130 mg/dL   Comprehensive metabolic panel   Result Value Ref Range    Sodium 140 133 - 144 mmol/L    Potassium 3.9 3.4 - 5.3 mmol/L    Chloride 108 94 - 109 mmol/L    Carbon Dioxide 24 20 - 32 mmol/L    Anion Gap 8 3 - 14 mmol/L    Glucose 68 (L) 70 - 99 mg/dL    Urea Nitrogen 17 7 - 30 mg/dL    Creatinine 0.81 0.66 - 1.25 mg/dL    GFR Estimate >90  Non  GFR Calc   >60 mL/min/1.7m2    GFR Estimate If Black >90   GFR Calc   >60 mL/min/1.7m2    Calcium 9.6 8.5 - 10.1 mg/dL    Bilirubin Total 0.5 0.2 - 1.3 mg/dL    Albumin 4.5 3.4 - 5.0 g/dL    Protein Total 7.1 6.8 - 8.8 g/dL    Alkaline Phosphatase 94 40 - 150 U/L    ALT 28 0 - 70 U/L    AST 25 0 - 45 U/L       If you have questions, please call the clinic at [phone].    Sincerely,     Candie Jj DNP, RN, FNP-BC

## 2017-07-14 NOTE — MR AVS SNAPSHOT
After Visit Summary   7/14/2017    Francisco J Hendrix    MRN: [id number]           Patient Information     Date Of Birth          1995        Visit Information        Provider Department      7/14/2017 10:00 AM AP [facility] HEALTH NURSE [facility]        Today's Diagnoses     Lipid screening        SOB (shortness of breath)        THA (generalized anxiety disorder)           Follow-ups after your visit        Your next 10 appointments already scheduled     Aug 15, 2017  9:00 AM CDT   Return Visit with Candie Jj NP   [facility] ([facility])    [address] [place] [address]   [phone]            Aug 15, 2017  9:00 AM CDT   Return Visit with Shan GonzalezD   [facility]    [address] [place] [address]   [phone]              Who to contact     Please call your clinic at [phone] to:    Ask questions about your health    Make or cancel appointments    Discuss your medicines    Learn about your test results    Speak to your doctor   If you have compliments or concerns about an experience at your clinic, or if you wish to file a complaint, please contact [facility] Patient Relations at [phone] or email us at [email or web address]         Additional Information About Your Visit        MyChart Information     MindMixer is an electronic gateway that provides easy, online access to your medical records. With MindMixer, you can request a clinic appointment, read your test results, renew a prescription or communicate with your care team.     To sign up for PagerDutyt visit the website at [email or web address]   You will be asked to enter the access code listed below, as well as some personal information. Please follow the directions to create your username and password.     Your access code is: [email or web address]  Expires: 9/18/2017 11:48 AM     Your access code  "will  in 90 days. If you need help or a new code, please contact your [facility] or call [phone] for assistance.        Care EveryWhere ID     This is your Care EveryWhere ID. This could be used by other organizations to access your [place] medical records  [id number]        Your Vitals Were     Height BMI (Body Mass Index)                5' 7\" (170.2 cm) 21.3 kg/m2           Blood Pressure from Last 3 Encounters:   17 116/68   17 111/71   09/15/16 121/70    Weight from Last 3 Encounters:   17 136 lb (61.7 kg)   17 137 lb 1.6 oz (62.2 kg)   17 136 lb (61.7 kg)              We Performed the Following     Comprehensive metabolic panel     Lipid panel reflex to direct LDL     Spirometry, Breathing Capacity        Primary Care Provider Office Phone # Fax #    Keyona Mc, PharmD [phone]       [facility] [address] [place] [address]        Equal Access to Services     [facility]: Hadii aad ku hadasho Soomaali, waaxda luqadaha, qaybta kaalmada adeegyada, waxscott randall . So [facility] [phone].    ATENCIÓN: Si habla español, tiene a romero disposición servicios gratuitos de asistencia lingüística. Llame al [phone].    We comply with applicable federal civil rights laws and [place] laws. We do not discriminate on the basis of race, color, national origin, age, disability sex, sexual orientation or gender identity.            Thank you!     Thank you for choosing [facility]  for your care. Our goal is always to provide you with excellent care. Hearing back from our patients is one way we can continue to improve our services. Please take a few minutes to complete the written survey that you may receive in the mail after your visit with us. Thank you!             Your Updated Medication List - Protect others around you: Learn how to safely use, store and throw away " your medicines at www.disposemymeds.org.          This list is accurate as of: 7/14/17 10:15 AM.  Always use your most recent med list.                   Brand Name Dispense Instructions for use Diagnosis    albuterol 108 (90 BASE) MCG/ACT Inhaler    PROAIR HFA/PROVENTIL HFA/VENTOLIN HFA     Inhale 2 puffs into the lungs as needed        escitalopram 20 MG tablet    LEXAPRO    30 tablet    Take 1 tablet (20 mg) by mouth daily    THA (generalized anxiety disorder)       nicotine 14 MG/24HR 24 hr patch    NICODERM CQ    30 patch    Place 1 patch onto the skin every 24 hours    Tobacco abuse       QUEtiapine 50 MG tablet    SEROquel     Take 50 mg by mouth        TYLENOL PO      Take 650 mg by mouth every 4 hours as needed for mild pain or fever

## 2017-07-18 NOTE — PROGRESS NOTES
SUBJECTIVE: Francisco J is a 22 year old male who is here to establish primary care, and  follow up on his comprehensive medication review. Co-visit with Candie Jj NP.       Environmental factors that impact patient: housing situation - currently has 10 roommates including 2 who share the same floor in the loft of his current house. Also impacted by programing at [facility] as he reports that he mainly smokes when attending program there. Francisco J also reports that he is hoping to be working within the next few weeks at [employer].     Francisco J reports being compliant all of the time and patient reports some side effects including dry mouth from the Seroquel.      Anxiety: Francisco J is interested in tapering off of the escitalopram. States that there was one time last week where he forgot to take it in the morning and didn't notice any difference in his mood.  States that he has also been feeling more on edge lately despite being on the escitalopram.     Insomnia: Francisco J states that with the Seroquel he wakes up about once per night where as without the Seroquel he wakes up 3-4 times and also takes longer to fall asleep.  However, he states that he might be interested in tapering off of the Seroquel in a month or so once he is busier with school and work as he sleeps better when he is busy during the day.     Smoking Cessation: Francisco J states that he wants to try NRT patches for smoking cessation. Is hoping to quit by the end of the month.     Back Pain: States back pain has gotten betters since he started stretching in the morning and evening. Uses Tylenol a lot less now.         Patient Active Problem List   Diagnosis     Chemical dependency (H)     Eczema, unspecified type     THA (generalized anxiety disorder)     Persistent insomnia     SOB (shortness of breath)     Tobacco abuse         OBJECTIVE:      THA-7 SCORE 6/20/2017 7/11/2017   Total Score 3 4   Some encounter information is confidential and restricted. Go to  "Review Flowsheets activity to see all data.         PHQ-9 SCORE 6/20/2017 7/11/2017   Total Score 3 3   Some encounter information is confidential and restricted. Go to Review Flowsheets activity to see all data.      VITALS:      BP Readings from Last 3 Encounters:   07/11/17 116/68   06/20/17 111/71   09/15/16 121/70         Weight:       Wt Readings from Last 1 Encounters:   07/11/17 137 lb 1.6 oz (62.2 kg)         Height:       Ht Readings from Last 1 Encounters:   07/11/17 5' 6.1\" (167.9 cm)      SOCIAL AND FAMILY HISTORY         Social History   Substance Use Topics     Smoking status: Current Every Day Smoker       Packs/day: 0.25       Types: Cigarettes, Other     Smokeless tobacco: Never Used         Comment: Started smoking at age 13     Alcohol use No         Comment: Clean date 4/25/17    .       Most Recent Immunizations   Administered Date(s) Administered     TDAP Vaccine (Adacel) 09/04/2013         CURRENT MEDICATIONS:    Current Outpatient Prescriptions           Current Outpatient Prescriptions   Medication Sig Dispense Refill     albuterol (PROAIR HFA/PROVENTIL HFA/VENTOLIN HFA) 108 (90 BASE) MCG/ACT Inhaler Inhale 2 puffs into the lungs as needed         nicotine (NICODERM CQ) 14 MG/24HR 24 hr patch Place 1 patch onto the skin every 24 hours 30 patch 1     escitalopram (LEXAPRO) 20 MG tablet Take 1 tablet (20 mg) by mouth daily 30 tablet 2     QUEtiapine (SEROQUEL) 50 MG tablet Take 50 mg by mouth         Acetaminophen (TYLENOL PO) Take 650 mg by mouth every 4 hours as needed for mild pain or fever         [DISCONTINUED] escitalopram (LEXAPRO) 20 MG tablet Take 20 mg by mouth daily         [DISCONTINUED] escitalopram (LEXAPRO) 20 MG tablet Take 20 mg by mouth daily         [DISCONTINUED] albuterol (PROAIR HFA, PROVENTIL HFA, VENTOLIN HFA) 108 (90 BASE) MCG/ACT inhaler Inhale 2 puffs into the lungs every 6 hours as needed for shortness of breath / dyspnea or wheezing                ALLERGIES:       "     Allergies   Allergen Reactions     Cats Hives     Nkda [No Known Drug Allergies]       Seasonal Allergies              ASSESSMENT:     Anxiety: Not optimally controlled with current medication per patient.  However, GAD7 score continues to be at goal.  May benefit from tapering off of escitalopram or switching to another medication in the future, but with upcoming changes now may not be the optimal time to do so.      Insomnia: At goal per patient.  Plan to taper off of Seroquel in a few months after living situation and schedule have stabilized sounds like a good plan.  A baseline (and repeat 3 months after initiation) BG reading and lipid panel are indicated due to Seroquel.     Smoking Cessation: Appropriate starting dose of the Nicoderm patch would be14mg/24hr for the first 6 weeks as only smoking 0.25-0.5packs/day. Would need to establish a quite date to start the patches on.  DTP : Needs additional therapy     Back Pain: Controlled per patient report.     All medications were reviewed and found to be indicated, effective, safe and convenient unless drug therapy problem identified as described above.     PLAN:      Anxiety: Continue taking Escitalopram 20mg tablet daily.     Insomnia: Continue taking Seroqeul 50mg tablet in the evening.     Smoking Cessation: Start 14mg/24hr Nicoderm patch on 7/22. May use E-cig as needed for breakthrough cravings. Counseled on unknowns of e-cig safety.     Obtain fasting BMP and Lipid panel in one week, f/u in one month     Options for treatment and/or follow-up care were reviewed with the patient. Francisco J Hendrix was engaged and actively involved in the decision making process. He/She verbalized understanding of the options discussed and was satisfied with the final plan.     Patient was provided with written instructions/medication list via AVS.      Linda Alvarez, Pharm D Student authored this note.  I made edits where necessary.     Medical conditions reviewed:  4     Medications reviewed: 4     DTP identified: 1     Time spent: 45 min     Level of service: 2

## 2017-07-26 ENCOUNTER — TELEPHONE (OUTPATIENT)
Dept: FAMILY MEDICINE | Facility: CLINIC | Age: 22
End: 2017-07-26

## 2017-07-26 NOTE — TELEPHONE ENCOUNTER
Called and left message for patient to call NP Clinic to schedule an appt to re-test spirometry. No Garcia, [facility]

## 2017-08-01 ENCOUNTER — TELEPHONE (OUTPATIENT)
Dept: PHARMACY | Facility: CLINIC | Age: 22
End: 2017-08-01

## 2017-08-01 NOTE — TELEPHONE ENCOUNTER
Called patient and left him a message to see if he would be able to come in on 8/16 instead of 8/15.

## 2019-05-29 NOTE — NURSING NOTE
"22 year old  Chief Complaint   Patient presents with     Lab Only     Bloodwork and spirometry completed.       Height 5' 7\" (170.2 cm), weight 136 lb (61.7 kg). Body mass index is 21.3 kg/(m^2).  BP completed using cuff size: regular    Patient Active Problem List   Diagnosis     Chemical dependency (H)     Eczema, unspecified type     THA (generalized anxiety disorder)     Persistent insomnia     SOB (shortness of breath)     Tobacco abuse       Wt Readings from Last 2 Encounters:   07/14/17 136 lb (61.7 kg)   07/11/17 137 lb 1.6 oz (62.2 kg)     BP Readings from Last 3 Encounters:   07/11/17 116/68   06/20/17 111/71   09/15/16 121/70       Current Outpatient Prescriptions   Medication     albuterol (PROAIR HFA/PROVENTIL HFA/VENTOLIN HFA) 108 (90 BASE) MCG/ACT Inhaler     nicotine (NICODERM CQ) 14 MG/24HR 24 hr patch     escitalopram (LEXAPRO) 20 MG tablet     QUEtiapine (SEROQUEL) 50 MG tablet     Acetaminophen (TYLENOL PO)     No current facility-administered medications for this visit.      Facility-Administered Medications Ordered in Other Visits   Medication     Self Administer Medications: Behavioral Services       Social History   Substance Use Topics     Smoking status: Current Every Day Smoker     Packs/day: 0.25     Types: Cigarettes, Other     Smokeless tobacco: Never Used      Comment: Started smoking at age 13     Alcohol use No      Comment: Clean date 4/25/17       There are no preventive care reminders to display for this patient.    No results found for: PAP    PHQ-2 ( 1999 Pfizer) 7/11/2017 6/20/2017   Q1: Little interest or pleasure in doing things 1 1   Q2: Feeling down, depressed or hopeless 0 0   PHQ-2 Score 1 1       PHQ-9 SCORE 6/20/2017 7/11/2017   Total Score 3 3   Some encounter information is confidential and restricted. Go to Review Flowsheets activity to see all data.       THA-7 SCORE 6/20/2017 7/11/2017   Total Score 3 4   Some encounter information is confidential and restricted. " Go to Review Flowsheets activity to see all data.       No flowsheet data found.     Spirometry was done.      No Garcia CMA  July 14, 2017 10:14 AM   family informed family informed